# Patient Record
Sex: MALE | Race: AMERICAN INDIAN OR ALASKA NATIVE
[De-identification: names, ages, dates, MRNs, and addresses within clinical notes are randomized per-mention and may not be internally consistent; named-entity substitution may affect disease eponyms.]

---

## 2017-09-11 ENCOUNTER — HOSPITAL ENCOUNTER (EMERGENCY)
Dept: HOSPITAL 14 - H.ER | Age: 67
Discharge: HOME | End: 2017-09-11
Payer: MEDICARE

## 2017-09-11 VITALS
HEART RATE: 58 BPM | RESPIRATION RATE: 18 BRPM | TEMPERATURE: 98 F | OXYGEN SATURATION: 99 % | SYSTOLIC BLOOD PRESSURE: 150 MMHG | DIASTOLIC BLOOD PRESSURE: 82 MMHG

## 2017-09-11 DIAGNOSIS — M54.31: Primary | ICD-10-CM

## 2017-09-11 PROCEDURE — 99282 EMERGENCY DEPT VISIT SF MDM: CPT

## 2017-09-11 PROCEDURE — 72100 X-RAY EXAM L-S SPINE 2/3 VWS: CPT

## 2017-09-11 PROCEDURE — 96372 THER/PROPH/DIAG INJ SC/IM: CPT

## 2017-09-11 NOTE — ED PDOC
HPI: Back


Time Seen by Provider: 09/11/17 13:48


Chief Complaint (Nursing): Back Pain


Chief Complaint (Provider): Back pain


History Per: Patient


History/Exam Limitations: no limitations


Onset/Duration Of Symptoms: Days (8)


Current Symptoms Are (Timing): Still Present


Quality Of Discomfort: "Pain"


Associated Symptoms: None


Additional History Per: Patient


Additional Complaint(s): 


The patient is a 68yo male, past medical history of hypertension, 

hypercholesterolemia, presents to the ED for evaluation of right sided lower 

back pain, present for the past 8 days, which radiates down to his right 

buttock and right ankle. Patient states he has had 4 other episodes of sciatica 

but states this episode has lingered on for the longest. He states he visited 

Dr. Marie 3 days ago and was prescribes Flexeril and Naprosyn which he has 

taken with no relief. He denies any hematuria, incontinence, trauma, nausea, 

vomiting abdominal pain. Patient offers no additional medical complaints.





Past Medical History


Reviewed: Historical Data, Nursing Documentation, Vital Signs


Vital Signs: 


 Last Vital Signs











Temp  97.9 F   09/11/17 13:42


 


Pulse  60   09/11/17 13:42


 


Resp  20   09/11/17 13:42


 


BP  156/92 H  09/11/17 13:42


 


Pulse Ox  95   09/11/17 13:42














- Medical History


PMH: Arthritis (eris knees), HTN, Hypercholesterolemia, Sleep Apnea (pt has c 

pap at home)


   Denies: Diabetes, HIV, Chronic Kidney Disease





- Surgical History


Surgical History: No Surg Hx





- Family History


Family History: States: Unknown Family Hx





- Social History


Current smoker - smoking cessation education provided: No


Alcohol: None


Drugs: Denies





- Immunization History


Hx Tetanus Toxoid Vaccination: No


Hx Influenza Vaccination: No


Hx Pneumococcal Vaccination: No





- Home Medications


Home Medications: 


 Ambulatory Orders











 Medication  Instructions  Recorded


 


Allopurinol 300 mg PO HS 03/20/16


 


Atorvastatin Calcium [Lipitor] 20 mg PO DAILY 03/20/16


 


Hydrochlorothiazide 25 mg PO DAILY 03/20/16


 


Metoprolol Tartrate 100 mg PO DAILY 03/20/16


 


Valsartan/Hydrochlorothiazide 1 tab PO DAILY 03/20/16





[Valsartan-Hctz 160-12.5 mg Tab]  


 


Ciprofloxacin [Cipro] 500 mg PO BID #14 tab 03/23/16


 


Docusate [Colace] 100 mg PO BID #0 cap 03/23/16


 


Metronidazole [Flagyl] 500 mg PO TID #21 tab 03/23/16


 


Oxycodone HCl/Acetaminophen 1 each PO Q6 #20 tablet 03/23/16





[Percocet 5-325 mg Tablet]  


 


Meloxicam [Mobic] 15 mg PO DAILY PRN #30 tab 09/11/17


 


Methocarbamol [Robaxin] 500 mg PO Q8 PRN #15 tab 09/11/17


 


Methylprednisolone [Medrol Dose 4 mg PO DAILY #21 mg 09/11/17





Pack (21 tabs)]  














- Allergies


Allergies/Adverse Reactions: 


 Allergies











Allergy/AdvReac Type Severity Reaction Status Date / Time


 


No Known Allergies Allergy   Verified 03/19/16 18:05














Review of Systems


ROS Statement: Except As Marked, All Systems Reviewed And Found Negative


Genitourinary Male: Negative for: Dysuria, Frequency, Incontinence, Hematuria


Musculoskeletal: Positive for: Back Pain, Leg Pain





Physical Exam





- Reviewed


Nursing Documentation Reviewed: Yes


Vital Signs Reviewed: Yes





- Physical Exam


Appears: Positive for: Non-toxic, No Acute Distress


Head Exam: Positive for: ATRAUMATIC, NORMAL INSPECTION, NORMOCEPHALIC


Skin: Positive for: Normal Color


Eye Exam: Positive for: Normal appearance


Neck: Positive for: Normal, Supple


Cardiovascular/Chest: Positive for: Regular Rate, Rhythm


Respiratory: Positive for: Normal Breath Sounds


Back: Positive for: Normal Inspection, Muscle Spasm (right sided paralumbar and 

right sided buttock tenderness and muscle spasm).  Negative for: L CVA 

Tenderness, R CVA Tenderness, Vertebral Tenderness


Extremity: Positive for: Normal ROM, Other (negative straight leg raise b/l).  

Negative for: Deformity, Swelling


Neurologic/Psych: Positive for: Alert, Oriented.  Negative for: Motor/Sensory 

Deficits





- ECG


O2 Sat by Pulse Oximetry: 95





- Progress


Re-evaluation Time: 15:57


Condition: Improving,but remains with symptoms





Medical Decision Making


Medical Decision Making: 


Time: 1355


Impression: Lower back pain


Plan:


-- Valium 10 mg PO


-- Toradol 30 mg IM


-- XR Lumbar spine


Reassess





Scribe Attestation:


Documented by Dana Eckert acting as a scribe for LONNY Martinez


Provider Attestation:


All medical record entries made by the Scribe were at my direction and 

personally dictated by me. I have reviewed the chart and agree that the record 

accurately reflects my personal performance of the history, physical exam, 

medical decision making, and the department course for this patient. I have 

also personally directed, reviewed, and agree with the discharge instructions 

and disposition.





Disposition





- Clinical Impression


Clinical Impression: 


 Sciatica








- Patient ED Disposition


Is Patient to be Admitted: No





- Disposition


Referrals: 


[facility] [place] [Outside]


Disposition: Routine/Home


Disposition Time: 15:58


Condition: IMPROVED


Prescriptions: 


Meloxicam [Mobic] 15 mg PO DAILY PRN #30 tab


 PRN Reason: pain


Methocarbamol [Robaxin] 500 mg PO Q8 PRN #15 tab


 PRN Reason: Muscle Spasm


Methylprednisolone [Medrol Dose Pack (21 tabs)] 4 mg PO DAILY #21 mg


Instructions:  Sciatica (ED)


Forms:  [facility] (English)


Print Language: ENGLISH

## 2017-09-12 NOTE — RAD
PROCEDURE:  Radiographs of the Lumbar Spine.



HISTORY:

pain







COMPARISON:

No prior.



FINDINGS:



BONES:

Straightening of lumbar curvature is appreciated. No fracture or 

spondylolisthesis. No suspicious lytic or blastic change.



DISC SPACES:

Gross multilevel degenerate spondylosis appreciate with multifocal 

syndesmotic fights at the mid lumbar levels appreciated. Disc height 

loss identified at L4-5 and L5-S1 and likely the remaining upper and 

mid lumbar intervertebral disc levels on a mild basis.



OTHER FINDINGS:

None.



IMPRESSION:

Advanced multilevel lumbar spondylosis including multilevel 

syndesmophytes.  Straightening of lumbar curvature is evident. No 

fracture or spondylolisthesis appreciated.

## 2018-05-02 ENCOUNTER — HOSPITAL ENCOUNTER (OUTPATIENT)
Dept: HOSPITAL 42 - ENDO | Age: 68
Discharge: HOME | End: 2018-05-02
Attending: SPECIALIST
Payer: MEDICARE

## 2018-05-02 VITALS
HEART RATE: 53 BPM | SYSTOLIC BLOOD PRESSURE: 147 MMHG | DIASTOLIC BLOOD PRESSURE: 68 MMHG | OXYGEN SATURATION: 96 % | TEMPERATURE: 97.8 F

## 2018-05-02 VITALS — BODY MASS INDEX: 50.2 KG/M2

## 2018-05-02 VITALS — RESPIRATION RATE: 18 BRPM

## 2018-05-02 DIAGNOSIS — K63.5: ICD-10-CM

## 2018-05-02 DIAGNOSIS — Z12.11: Primary | ICD-10-CM

## 2018-05-02 DIAGNOSIS — K64.8: ICD-10-CM

## 2018-05-02 DIAGNOSIS — K62.1: ICD-10-CM

## 2018-05-02 DIAGNOSIS — D12.3: ICD-10-CM

## 2018-05-02 PROCEDURE — 88305 TISSUE EXAM BY PATHOLOGIST: CPT

## 2018-05-02 PROCEDURE — 45385 COLONOSCOPY W/LESION REMOVAL: CPT

## 2019-01-18 ENCOUNTER — HOSPITAL ENCOUNTER (EMERGENCY)
Dept: HOSPITAL 14 - H.ER | Age: 69
Discharge: HOME | End: 2019-01-18
Payer: MEDICARE

## 2019-01-18 VITALS — HEART RATE: 68 BPM | OXYGEN SATURATION: 98 % | SYSTOLIC BLOOD PRESSURE: 150 MMHG | DIASTOLIC BLOOD PRESSURE: 89 MMHG

## 2019-01-18 VITALS — TEMPERATURE: 98.1 F | RESPIRATION RATE: 18 BRPM

## 2019-01-18 VITALS — BODY MASS INDEX: 51.5 KG/M2

## 2019-01-18 DIAGNOSIS — E78.00: ICD-10-CM

## 2019-01-18 DIAGNOSIS — J40: Primary | ICD-10-CM

## 2019-01-18 DIAGNOSIS — I10: ICD-10-CM

## 2019-01-18 NOTE — ED PDOC
HPI: Influenza


Time Seen by Provider: 19 21:23


Chief Complaint: Cough, Cold, Congestion


Chief Complaint (Provider): Cough, Cold, Congestion


History Per: Patient


Exam Limitations: no limitations


Onset/Duration Of Symptoms: Days (3x weeks, intermittent, worsened for the past 

4x hours)


Symptoms include: fever (mild tactile fever yesterday, has since resolved), 

cough, nasal congestion


Additional complaint(s):: 





68 year old male with a past medical history of hypertension presents to the ED 

for an evaluation of a cough and cold intermittent for the past 3x weeks. 

Patient reports that today his symptoms returned and worsened, and he began 

experiencing nasal congestion as well. Patient reports taking Theraflu, 

robitussin, and nyquil with no relief. Patient also reports having a mild 

tactile fever yesterday, which has since resolved. Otherwise, patient denies 

having a history of asthma, smoking, or any other complaints.





PMD: Tyler Marie MD





Past Medical History


Reviewed: Historical Data, Nursing Documentation, Vital Signs


Vital Signs: 





                                Last Vital Signs











Temp  98.1 F   19 21:06


 


Pulse  58 L  19 21:06


 


Resp  18   19 21:06


 


BP  169/83 H  19 21:06


 


Pulse Ox  96   19 21:06











YOJANA Report Viewed: Yes





- Medical History


PMH: Arthritis (GOUT), HTN, Hypercholesterolemia, Sleep Apnea (pt has c pap at 

home)


   Denies: Asthma, Diabetes, HIV, Chronic Kidney Disease





- Surgical History


Surgical History: 


   Denies: Pacemaker





- Family History


Family History: States: No Known Family Hx





- Social History


Current smoker - smoking cessation education provided: No


Alcohol: None


Drugs: Denies





- Immunization History


Hx Tetanus Toxoid Vaccination: No


Hx Influenza Vaccination: No


Hx Pneumococcal Vaccination: No





- Home Medications


Home Medications: 


                                Ambulatory Orders











 Medication  Instructions  Recorded


 


Allopurinol 300 mg PO HS 16


 


Valsartan/Hydrochlorothiazide 1 tab PO DAILY 16





[Valsartan-Hctz 160-12.5 mg Tab]  


 


Amlodipine Besylate/Benazepril 1 cap PO DAILY 18





[Amlodipine-Benazepril 5-20 mg]  


 


Atorvastatin [Lipitor] 20 mg PO DAILY 18


 


Ergocalciferol (Vitamin D2) 2,000 iu PO DAILY 18





[Vitamin D2]  


 


Metoprolol Martinez/Hydrochlorothiaz 1 each PO DAILY 18





[Metoprolol ER-Hctz 100-12.5 mg]  


 


Ubidecarenone/Vit E Acet [Co Q-10 1 each PO DAILY 18





100 mg Softgel]  


 


Multivitamin with Minerals [Men's 1 each PO DAILY 18





One Daily]  


 


Albuterol HFA [Ventolin HFA 90 2 puff IH [id number] PRN #1 inhaler 19





mcg/actuation (8 g)]  


 


Azithromycin [Zithromax] 250 mg PO DAILY #6 tab 19


 


Promethazine/Codeine 5 ml PO Q12 PRN #100 ml 19





[Codeine/Promethazine 10 MG/5  





Ml-6.25 MG/5 Ml]  














- Allergies


Allergies/Adverse Reactions: 


                                    Allergies











Allergy/AdvReac Type Severity Reaction Status Date / Time


 


No Known Allergies Allergy   Verified 19 21:06














Review of Systems


ROS Statement: Except As Marked, All Systems Reviewed And Found Negative


Constitutional: Negative for: Fever (mild tactile fever yesterday, has since 

resolved)


ENT: Positive for: Nose Congestion


Respiratory: Positive for: Cough





Physical Exam





- Reviewed


Nursing Documentation Reviewed: Yes


Vital Signs Reviewed: Yes





- Physical Exam


Appears: Positive for: Well, Non-toxic, No Acute Distress


Head Exam: Positive for: ATRAUMATIC, NORMOCEPHALIC


Skin: Positive for: Normal Color, Warm, Dry


Eye Exam: Positive for: Normal appearance


ENT: Positive for: Pharynx Is (clear), Nasal Congestion.  Negative for: 

Pharyngeal Erythema, Tonsillar Exudate, Tonsillar Swelling


Cardiovascular/Chest: Positive for: Regular Rate, Rhythm


Respiratory: Positive for: Other (diminished breath sounds)


Neurologic/Psych: Positive for: Alert, Oriented (3x)





Medical Decision Making


Medical Decision Makin:23


Initial impression: 68 year old male with a cough and congestion


Initial plan:


* XRay chest 2 views


* duoneb 3 ml INH


* peak flow pre post treatment


* reevaluation





 

--------------------------------------------------------------------------------


-----------------


Scribe Attestation:


Documented byPrincess Talbert, acting as a scribe for Elaine Gonzales PA-C.





Provider Scribe Attestation:


All medical record entries made by the Scribe were at my direction and 

personally dictated by me. I have reviewed the chart and agree that the record 

accurately reflects my personal performance of the history, physical exam, 

medical decision making, and the department course for this patient. I have also

personally directed, reviewed, and agree with the discharge instructions and 

disposition.





- ECG


O2 Sat by Pulse Oximetry: 96 (RA)


Pulse Ox Interpretation: Normal





Disposition





- Clinical Impression


Clinical Impression: 


 Bronchitis








- Patient ED Disposition


Is Patient to be Admitted: No





- Disposition


Disposition: Routine/Home


Disposition Time: 22:08


Condition: FAIR


Prescriptions: 


Albuterol HFA [Ventolin HFA 90 mcg/actuation (8 g)] 2 puff IH [id number] PRN #1 

inhaler


 PRN Reason: Cough


Azithromycin [Zithromax] 250 mg PO DAILY #6 tab


Promethazine/Codeine [Codeine/Promethazine 10 MG/5 Ml-6.25 MG/5 Ml] 5 ml PO Q12 

PRN #100 ml


 PRN Reason: Cough


Instructions:  Acute Bronchitis

## 2019-01-19 NOTE — RAD
Date of service: 



01/18/2019



HISTORY:

 cough/chest pain 



COMPARISON:

Chest radiograph dated 03/21/2016



TECHNIQUE:

Chest PA and lateral



FINDINGS:



LUNGS:

No active pulmonary disease.



PLEURA:

No significant pleural effusion identified. No pneumothorax apparent.



CARDIOVASCULAR:

Aortic atherosclerotic calcifications.  Cardiomediastinal silhouette 

stably enlarged 



OSSEOUS STRUCTURES:

Unchanged.



VISUALIZED UPPER ABDOMEN:

Normal.



OTHER FINDINGS:

None.



IMPRESSION:

No active disease.